# Patient Record
Sex: FEMALE | Race: WHITE | NOT HISPANIC OR LATINO | Employment: STUDENT | URBAN - METROPOLITAN AREA
[De-identification: names, ages, dates, MRNs, and addresses within clinical notes are randomized per-mention and may not be internally consistent; named-entity substitution may affect disease eponyms.]

---
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
## 2025-03-04 ENCOUNTER — PROCEDURE VISIT (OUTPATIENT)
Dept: OBGYN CLINIC | Facility: CLINIC | Age: 20
End: 2025-03-04
Payer: COMMERCIAL

## 2025-03-04 VITALS
DIASTOLIC BLOOD PRESSURE: 68 MMHG | BODY MASS INDEX: 25.07 KG/M2 | HEIGHT: 61 IN | WEIGHT: 132.8 LBS | SYSTOLIC BLOOD PRESSURE: 106 MMHG

## 2025-03-04 DIAGNOSIS — Z30.46 ENCOUNTER FOR NEXPLANON REMOVAL: Primary | ICD-10-CM

## 2025-03-04 PROCEDURE — 11982 REMOVE DRUG IMPLANT DEVICE: CPT | Performed by: NURSE PRACTITIONER

## 2025-03-04 NOTE — PATIENT INSTRUCTIONS
Remove the pressure dressing after 24 hours  Keep area clean and dry, cover with a fresh band aid daily until healed  You will have some bruising at the site, which is normal  Avoid heavy lifting with the affected arm until the incision is healed  Call the office with any concerns   Return visit if wishes to discuss options for birth control.

## 2025-03-04 NOTE — PROGRESS NOTES
[facility] - [id number] [place] [address]    Assessment/Plan:  1. Encounter for Nexplanon removal      Assessment & Plan  Encounter for Nexplanon removal  Keep area clean and dry and avoid heavy lifting until incision site is healed.   Call with any concerns              Subjective:   Amanda Mccall is a 19 y.o.  female.    HPI:   19 year old female presents for office visit for Nexplanon removal. Reports heavy bleeding since insertion, not relieved with addition of oral contraceptive. Planning not to use any birth control for awhile, using for cycle control only.         Gyn History  No LMP recorded (lmp unknown). Patient has had an implant.       Last pap smear: Not on file    She  reports being sexually active and has had partner(s) who are female.       OB History      Past Medical History:  No date: Allergies      Comment:  dust and dogs  No date: Asthma      Comment:  Inhaler  No date: Dysmenorrhea  No date: Seasonal allergies     Past Surgical History:  No date: TONSILLECTOMY     Social History     Tobacco Use    Smoking status: Never    Smokeless tobacco: Never    Tobacco comments:     Nonsmoker    Vaping Use    Vaping status: Never Used   Substance Use Topics    Alcohol use: Never     Comment: No alcohol use     Drug use: Never     Comment: No           Current Outpatient Medications:     albuterol (PROVENTIL HFA,VENTOLIN HFA) 90 mcg/act inhaler, Inhale 2 puffs every 6 (six) hours as needed, Disp: , Rfl:     amphetamine-dextroamphetamine (ADDERALL) 5 MG tablet, TAKE ONE (1) TABLET BY MOUTH EVERY MORNING AND ONE (1) TABLET AT NOON, Disp: , Rfl:     azelastine (ASTELIN) 0.1 % nasal spray, into each nostril, Disp: , Rfl:     cetirizine (ZyrTEC) 10 mg tablet, Take 10 mg by mouth daily, Disp: , Rfl:     etonogestrel (Nexplanon) subdermal implant, 68 mg by Subdermal route Once every 3 years, Disp: , Rfl:     Flovent  MCG/ACT inhaler, Inhale 2 puffs 2 (two) times a  "day, Disp: , Rfl:     dexmethylphenidate (FOCALIN XR) 20 MG 24 hr capsule, , Disp: , Rfl:     norgestimate-ethinyl estradiol (Sprintec 28) 0.25-35 MG-MCG per tablet, Take 1 tablet by mouth daily (Patient not taking: Reported on 3/4/2025), Disp: 28 tablet, Rfl: 0    She has no known allergies..    ROS: Review of Systems See HPI for details, otherwise negative    Objective:  /68 (BP Location: Left arm, Patient Position: Sitting, Cuff Size: Standard)   Ht 5' 1.25\" (1.556 m)   Wt 60.2 kg (132 lb 12.8 oz)   LMP  (LMP Unknown)   Breastfeeding No   BMI 24.89 kg/m²      Physical Exam  Constitutional:       General: She is not in acute distress.     Appearance: Normal appearance. She is not ill-appearing.   HENT:      Head: Normocephalic and atraumatic.   Pulmonary:      Effort: Pulmonary effort is normal.   Neurological:      Mental Status: She is alert.   Psychiatric:         Thought Content: Thought content normal.       Universal Protocol:  procedure performed by consultantConsent: Verbal consent obtained. Written consent not obtained.  Risks and benefits: risks, benefits and alternatives were discussed  Consent given by: patient  Time out: Immediately prior to procedure a \"time out\" was called to verify the correct patient, procedure, equipment, support staff and site/side marked as required.  Required items: required blood products, implants, devices, and special equipment available  Patient identity confirmed: verbally with patient  Remove and insert drug implant    Date/Time: 3/4/2025 12:20 PM    Performed by: NOLAN Linn  Authorized by: NOLAN Linn    Indication:     Indication: Presence of non-biodegradable drug delivery implant    Pre-procedure:     Pre-procedure timeout performed: yes      Prepped with: povidone-iodine      Local anesthetic:  Lidocaine with epinephrine    The site was cleaned and prepped in a sterile fashion: yes    Procedure:     Procedure:  Removal    Small " stab incision was made in arm: yes      Left/right:  Left    Visualization of implant was obtained: yes      Site was closed with steri-strips and pressure bandage applied: yes    Comments:      Nexplanon removed easily and intact, 2 mm incision. Device shown to pt.

## 2025-03-06 ENCOUNTER — NURSE TRIAGE (OUTPATIENT)
Dept: OTHER | Facility: OTHER | Age: 20
End: 2025-03-06

## 2025-03-06 ENCOUNTER — OFFICE VISIT (OUTPATIENT)
Dept: OBGYN CLINIC | Facility: CLINIC | Age: 20
End: 2025-03-06
Payer: COMMERCIAL

## 2025-03-06 VITALS
BODY MASS INDEX: 25.68 KG/M2 | SYSTOLIC BLOOD PRESSURE: 104 MMHG | DIASTOLIC BLOOD PRESSURE: 64 MMHG | HEIGHT: 61 IN | WEIGHT: 136 LBS

## 2025-03-06 DIAGNOSIS — R42 DIZZINESS: ICD-10-CM

## 2025-03-06 DIAGNOSIS — N92.6 IRREGULAR MENSES: Primary | ICD-10-CM

## 2025-03-06 PROCEDURE — 99213 OFFICE O/P EST LOW 20 MIN: CPT | Performed by: PHYSICIAN ASSISTANT

## 2025-03-07 NOTE — TELEPHONE ENCOUNTER
FOLLOW UP: Pt will be seen tonight in the ER STEVEN Lauren out of concern for how the opening looks to her. She states she still may want to be seen in office tomorrow. Please call to schedule if needed.     REASON FOR CONVERSATION: Follow-up (Stitch came apart from nexplon removal)    SYMPTOMS: opening of stitched area no bleeding, no pain.    OTHER: none    DISPOSITION: No disposition on file.

## 2025-03-07 NOTE — TELEPHONE ENCOUNTER
Esc response from on call Dr Linda: If she having severe pain or the incision is bleeding or red then she should be seen tonight. If not then ok to be seen tomorrow in the office.

## 2025-03-07 NOTE — TELEPHONE ENCOUNTER
"Answer Assessment - Initial Assessment Questions  1. IMPLANT TYPE: \"What type of implant are you using?\"  (e.g., Implanon, Nexplanon, Norplant, Jadelle)       Nexplanon removal  2. IMPLANT START DATE: \"When did you first start using the implant?\"      Removal 3/4/2025  3. IMPLANT LOCATION: \"Where is implant located?\"       Arm   4. SYMPTOM: \"What is the main symptom (or question) you're concerned about?\"     1-2  Stiches have come apart and skin is .   5. ONSET: \"When did the stich openstart?\"      This afternoon   6. VAGINAL BLEEDING: \"Are you having any unusual vaginal bleeding?\"      N/A  7. PAIN: \"Is there any pain?\" (Scale: 1-10; mild, moderate, severe).      Mild pain when flexing the arm and burning  8. PREGNANCY: \"Are you concerned you might be pregnant?\" \"Are you having any symptoms of pregnancy (breast tenderness, nausea, etc)?\"        N/A  9.  STD (STI): \"Are you concerned about the possibility of a STD (STI)?\" \"Are you having unprotected sex (sex without a condom)?\"      N/A    Protocols used: Contraception - Implant-Pediatric-    "

## 2025-03-07 NOTE — TELEPHONE ENCOUNTER
Esc to on call Dr Linda:Pt had her nexplanon removed this week Tuesday and two stiches have come apart ant the area is now open

## 2025-03-07 NOTE — PROGRESS NOTES
[facility] [address], [address], [place] [address]    ASSESSMENT/PLAN:     1. Irregular menses  Assessment & Plan:  Recommend continuing to monitor menses now that Nexplanon has been removed and see if they return as bad as they were previously.   Reviewed options if they do return heavy and painful including Ibuprofen 600mg every 6 hours with food, Lysteda, hormonal OCP, patch, NuvaRing, Depo Provera, levonorgestrel IUD.   Patient does not want to be on any hormonal options. Will continue to monitor and call if bad menses return.   Script for CBC, Iron studies and TFTS given to evaluate dizziness and fatigue.   Return to office for annual or as needed.   2. Dizziness  -     CBC and differential; Future  -     Iron; Future  -     Ferritin; Future  -     TSH, 3rd generation; Future  -     T4, free; Future  -     CBC and differential  -     Iron  -     Ferritin  -     TSH, 3rd generation  -     T4, free      CC:  discuss what to expect now that Nexplanon removed and options to control bleeding.     HPI: Amanda Mccall is a 19 y.o.  who presents for discussion of options to control painful, heavy menses. Patient had Nexplanon removed 3/4/2025 secondary to persistent bleeding. Previously tried OCP (Junel Fe 1/20) stopped secondary to note being good at remembering to take it daily.   Prior to Nexplanon bleeding was heavy, painful and prolonged. Also had pain in between menses.   Was taking Ibuprofen around the clock to help with pain. Went through a whole bottle of Ibuprofen in a week in the past. Had cramping in between menses.   Pelvic ultrasound done 10/28/2024 showed left simple ovarian cyst measuring 3.4 x 2.9 x 2.3 cm, decreased in size from last ultrasound. No additional follow up needed. Otherwise normal uterus.     Requests labs as still gets occasional dizziness.     Patient only sexually active with female partners, no need for contraception.     ROS: Negative except as noted  in HPI    No LMP recorded (lmp unknown). Patient has had an implant.       She  reports being sexually active and has had partner(s) who are female.       The following portions of the patient's history were reviewed and updated as appropriate:   Past Medical History:   Diagnosis Date    Allergies     dust and dogs    Asthma     Inhaler    Dysmenorrhea     Seasonal allergies      Past Surgical History:   Procedure Laterality Date    TONSILLECTOMY       Family History   Problem Relation Age of Onset    Breast cancer Mother         negative for BRCA     Lymphoma Mother      Social History     Socioeconomic History    Marital status: Single     Spouse name: None    Number of children: None    Years of education: None    Highest education level: None   Occupational History    Occupation: Student    Tobacco Use    Smoking status: Never    Smokeless tobacco: Never    Tobacco comments:     Nonsmoker    Vaping Use    Vaping status: Never Used   Substance and Sexual Activity    Alcohol use: Never     Comment: No alcohol use     Drug use: Never     Comment: No     Sexual activity: Yes     Partners: Female   Other Topics Concern    None   Social History Narrative    Mammo: Never    Colonoscopy: Never    Dexa: Never    Sexual abuse: No    Exercise 2-3 times a week    Domestic violence: No     Social Drivers of Health     Financial Resource Strain: Not on file   Food Insecurity: Not on file   Transportation Needs: Not on file   Physical Activity: Not on file   Stress: Not on file   Social Connections: Not on file   Intimate Partner Violence: Not on file   Housing Stability: Not on file     Outpatient Medications Marked as Taking for the 3/6/25 encounter (Office Visit) with Starla Stringer PA-C   Medication    albuterol (PROVENTIL HFA,VENTOLIN HFA) 90 mcg/act inhaler    amphetamine-dextroamphetamine (ADDERALL) 5 MG tablet    azelastine (ASTELIN) 0.1 % nasal spray    cetirizine (ZyrTEC) 10 mg tablet    Flovent  MCG/ACT  "inhaler    [DISCONTINUED] etonogestrel (Nexplanon) subdermal implant     No Known Allergies        Objective:  /64 (BP Location: Left arm, Patient Position: Sitting, Cuff Size: Standard)   Ht 5' 1.25\" (1.556 m)   Wt 61.7 kg (136 lb)   LMP  (LMP Unknown)   BMI 25.49 kg/m²          Physical Exam  Constitutional:       Appearance: She is well-developed.   HENT:      Head: Normocephalic and atraumatic.   Neck:      Thyroid: No thyromegaly.   Cardiovascular:      Rate and Rhythm: Normal rate and regular rhythm.      Heart sounds: Normal heart sounds. No murmur heard.     No friction rub. No gallop.   Pulmonary:      Effort: Pulmonary effort is normal. No respiratory distress.      Breath sounds: Normal breath sounds. No wheezing.   Abdominal:      General: There is no distension.      Palpations: Abdomen is soft. There is no mass.      Tenderness: There is no abdominal tenderness. There is no guarding or rebound.      Hernia: No hernia is present.   Lymphadenopathy:      Cervical: No cervical adenopathy.   Neurological:      Mental Status: She is alert and oriented to person, place, and time.   Skin:     General: Skin is warm and dry.   Psychiatric:         Behavior: Behavior normal.             Starla Stringer PA-C  3/6/2025 9:33 PM    "

## 2025-03-07 NOTE — ASSESSMENT & PLAN NOTE
Recommend continuing to monitor menses now that Nexplanon has been removed and see if they return as bad as they were previously.   Reviewed options if they do return heavy and painful including Ibuprofen 600mg every 6 hours with food, Lysteda, hormonal OCP, patch, NuvaRing, Depo Provera, levonorgestrel IUD.   Patient does not want to be on any hormonal options. Will continue to monitor and call if bad menses return.   Script for CBC, Iron studies and TFTS given to evaluate dizziness and fatigue.   Return to office for annual or as needed.

## 2025-03-15 LAB
BASOPHILS # BLD AUTO: 0 X10E3/UL (ref 0–0.2)
BASOPHILS NFR BLD AUTO: 1 %
EOSINOPHIL # BLD AUTO: 0.2 X10E3/UL (ref 0–0.4)
EOSINOPHIL NFR BLD AUTO: 5 %
ERYTHROCYTE [DISTWIDTH] IN BLOOD BY AUTOMATED COUNT: 12.5 % (ref 11.7–15.4)
FERRITIN SERPL-MCNC: 48 NG/ML (ref 15–77)
HCT VFR BLD AUTO: 39.6 % (ref 34–46.6)
HGB BLD-MCNC: 13.9 G/DL (ref 11.1–15.9)
IMM GRANULOCYTES # BLD: 0 X10E3/UL (ref 0–0.1)
IMM GRANULOCYTES NFR BLD: 0 %
IRON SERPL-MCNC: 50 UG/DL (ref 27–159)
LYMPHOCYTES # BLD AUTO: 2 X10E3/UL (ref 0.7–3.1)
LYMPHOCYTES NFR BLD AUTO: 49 %
MCH RBC QN AUTO: 30.4 PG (ref 26.6–33)
MCHC RBC AUTO-ENTMCNC: 35.1 G/DL (ref 31.5–35.7)
MCV RBC AUTO: 87 FL (ref 79–97)
MONOCYTES # BLD AUTO: 0.5 X10E3/UL (ref 0.1–0.9)
MONOCYTES NFR BLD AUTO: 12 %
MORPHOLOGY BLD-IMP: ABNORMAL
NEUTROPHILS # BLD AUTO: 1.3 X10E3/UL (ref 1.4–7)
NEUTROPHILS NFR BLD AUTO: 33 %
PLATELET # BLD AUTO: 135 X10E3/UL (ref 150–450)
RBC # BLD AUTO: 4.57 X10E6/UL (ref 3.77–5.28)
T4 FREE SERPL-MCNC: 1.19 NG/DL (ref 0.93–1.6)
TSH SERPL DL<=0.005 MIU/L-ACNC: 0.97 UIU/ML (ref 0.45–4.5)
WBC # BLD AUTO: 4.1 X10E3/UL (ref 3.4–10.8)

## 2025-03-17 ENCOUNTER — RESULTS FOLLOW-UP (OUTPATIENT)
Dept: OBGYN CLINIC | Facility: CLINIC | Age: 20
End: 2025-03-17

## 2025-03-29 ENCOUNTER — NURSE TRIAGE (OUTPATIENT)
Dept: OTHER | Facility: OTHER | Age: 20
End: 2025-03-29

## 2025-03-29 NOTE — TELEPHONE ENCOUNTER
"FOLLOW UP: OBGYN    REASON FOR CONVERSATION: Unprotected Sex and Pelvic Pain    SYMPTOMS: Condom Leaking, and chronic intermittent pelvic pain    OTHER: n/a    DISPOSITION: See PCP Within 2 Weeks, Call PCP Within 24 Hours  Reason for Disposition   [1] Unprotected sexual intercourse AND [2] within past 72 to 120 hours (3 to 5 days)   Abdominal pain is a chronic symptom (recurrent or ongoing AND present > 4 weeks)    Answer Assessment - Initial Assessment Questions  1. REASON FOR CALL or QUESTION: \"What is your reason for calling today?\" or \"How can I best help you?\" or \"What question do you have that I can help answer?\"     Leaking Condom noted after intercourse tonight. Would like to know if risk for pregnancy, and emergency contraceptive. Also reports intermittent abdominal/pelvic  pain rated 6/10 while not active. No Pain as of this time. (Chronic ongoing problem discussed with OBGYN) Also would like discuss contraceptive full time.    Protocols used: Contraception - Emergency-Adult-, Information Only Call-ADULT-AH, Abdominal Pain - Female-Adult-AH    "

## 2025-03-29 NOTE — TELEPHONE ENCOUNTER
Patient calling due to concern with risk of pregnancy after noting semen leaking around condom. Education provided on risk of pregnancy with any internal; exposure to semen, and emergency contraceptive options. Patient  requesting appointment to discuss birth control option with provider, and continued intermittent chronic pelvic pain, now resolved. No additional symptoms reported.  Care advice given including education for recognition of severe signs/symptoms which would require immediate evaluation. Informed to call back if worsening/developing symptoms and/or uncertain. Verbalized understanding. Agreeable with disposition. No further questions.

## 2025-04-03 ENCOUNTER — OFFICE VISIT (OUTPATIENT)
Dept: OBGYN CLINIC | Facility: CLINIC | Age: 20
End: 2025-04-03
Payer: COMMERCIAL

## 2025-04-03 VITALS
HEIGHT: 61 IN | DIASTOLIC BLOOD PRESSURE: 58 MMHG | WEIGHT: 130 LBS | SYSTOLIC BLOOD PRESSURE: 102 MMHG | BODY MASS INDEX: 24.55 KG/M2

## 2025-04-03 DIAGNOSIS — N92.0 MENORRHAGIA WITH REGULAR CYCLE: Primary | ICD-10-CM

## 2025-04-03 DIAGNOSIS — Z11.3 ROUTINE SCREENING FOR STI (SEXUALLY TRANSMITTED INFECTION): ICD-10-CM

## 2025-04-03 DIAGNOSIS — N94.6 DYSMENORRHEA: ICD-10-CM

## 2025-04-03 DIAGNOSIS — R10.2 PELVIC PAIN: ICD-10-CM

## 2025-04-03 DIAGNOSIS — Z32.02 NEGATIVE PREGNANCY TEST: ICD-10-CM

## 2025-04-03 DIAGNOSIS — Z80.3 FAMILY HISTORY OF BREAST CANCER: ICD-10-CM

## 2025-04-03 LAB — SL AMB POCT URINE HCG: NORMAL

## 2025-04-03 PROCEDURE — 99214 OFFICE O/P EST MOD 30 MIN: CPT | Performed by: PHYSICIAN ASSISTANT

## 2025-04-03 PROCEDURE — 81025 URINE PREGNANCY TEST: CPT | Performed by: PHYSICIAN ASSISTANT

## 2025-04-03 RX ORDER — ETONOGESTREL AND ETHINYL ESTRADIOL VAGINAL RING .015; .12 MG/D; MG/D
RING VAGINAL
Qty: 3 EACH | Refills: 1 | Status: SHIPPED | OUTPATIENT
Start: 2025-04-03

## 2025-04-03 NOTE — PROGRESS NOTES
[facility] [address], [address], [place] [address]    ASSESSMENT/PLAN:     1. Menorrhagia with regular cycle  Assessment & Plan:  Reviewed heavy, painful menses as well as options. Will plan pelvic ultrasound to further evaluate.   Reviewed OCP (was not good at remembering in the past), NuvaRing, patch, IUD.   Most interested in NuvaRing. Reviewed how to insert, can take out for up to 3 hrs during intercourse if bothering her or her partner. Reviewed when to insert. Recommend using condoms for the 1st month and for STD prevention.  Reviewed common side effects including nausea, vomiting, breast pain, bloating, fatigue, mood swings, weight gain, increased acne.  Reassured side effects typically diminished in the 1st month or 2. Reviewed clotting risk and signs and symptoms of pulmonary embolism, DVT, myocardial infarction, stroke.  Return to office in 3 months for NuvaRing check.     Orders:  -     etonogestrel-ethinyl estradiol (NuvaRing) 0.12-0.015 MG/24HR vaginal ring; Insert vaginally and leave in place for 3 consecutive weeks, then remove for 1 week.  2. Negative pregnancy test  Assessment & Plan:  Reviewed pregnancy test negative in the office. Too soon from episode of intercourse to rule on pregnancy. Reviewed to take a test 2 weeks after the episode of intercourse occurred or if menses is late for more accurate result.   Orders:  -     POCT urine HCG  3. Family history of breast cancer  Assessment & Plan:  Reviewed family hx of breast cancer. Reassuring mother's genetic testing was negative. Patient would like genetic testing as well. Information given.   Orders:  -     Ambulatory Referral to Oncology Genetics; Future  4. Pelvic pain  -     US pelvis complete w transvaginal; Future; Expected date: 04/03/2025  5. Dysmenorrhea  -     US pelvis complete w transvaginal; Future; Expected date: 04/03/2025  6. Routine screening for STI (sexually transmitted infection)  Assessment &  Plan:  STD cultures done today.   Orders:  -     Chlamydia/GC MONICA, Confirmation      CC:  birth control discussion    HPI: Amanda Mccall is a 19 y.o.  who presents for birth control discussion. Patient had Nexplanon removed on 3/4/2025 secondary to irregular bleeding. Since getting off the Nexplanon has had menses and is very painful and heavy. Also now sexually active with male partner and would like a birth control method. Would also like STD testing. Concerned about possible pregnancy. [place] was just a few days ago.     No known family hx of blood clotting disorders, no migraines with aura. Nonsmoker.     Family hx of breast cancer in mother dx age 40. Mother was negative for BRCA mutation. Maternal aunt with breast cancer as well.     ROS: Negative except as noted in HPI    Patient's last menstrual period was 2025.       She  reports being sexually active and has had partner(s) who are female.       The following portions of the patient's history were reviewed and updated as appropriate:   Past Medical History:   Diagnosis Date    Allergies     dust and dogs    Asthma     Inhaler    Dysmenorrhea     Seasonal allergies      Past Surgical History:   Procedure Laterality Date    TONSILLECTOMY       Family History   Problem Relation Age of Onset    Breast cancer Mother 40        negative for BRCA     Lymphoma Mother     Breast cancer Maternal Aunt      Social History     Socioeconomic History    Marital status: Single     Spouse name: Not on file    Number of children: Not on file    Years of education: Not on file    Highest education level: Not on file   Occupational History    Occupation: Student    Tobacco Use    Smoking status: Never    Smokeless tobacco: Never    Tobacco comments:     Nonsmoker    Vaping Use    Vaping status: Never Used   Substance and Sexual Activity    Alcohol use: Never     Comment: No alcohol use     Drug use: Never     Comment: No     Sexual activity: Yes     Partners:  "Female   Other Topics Concern    Not on file   Social History Narrative    Mammo: Never    Colonoscopy: Never    Dexa: Never    Sexual abuse: No    Exercise 2-3 times a week    Domestic violence: No     Social Drivers of Health     Financial Resource Strain: Not on file   Food Insecurity: Not on file   Transportation Needs: Not on file   Physical Activity: Not on file   Stress: Not on file   Social Connections: Not on file   Intimate Partner Violence: Not on file   Housing Stability: Not on file     Outpatient Medications Marked as Taking for the 4/3/25 encounter (Office Visit) with Starla Stringer PA-C   Medication    albuterol (PROVENTIL HFA,VENTOLIN HFA) 90 mcg/act inhaler    amphetamine-dextroamphetamine (ADDERALL) 5 MG tablet    azelastine (ASTELIN) 0.1 % nasal spray    cetirizine (ZyrTEC) 10 mg tablet    etonogestrel-ethinyl estradiol (NuvaRing) 0.12-0.015 MG/24HR vaginal ring    Flovent  MCG/ACT inhaler     No Known Allergies        Objective:  /58 (BP Location: Left arm, Patient Position: Sitting, Cuff Size: Standard)   Ht 5' 1.25\" (1.556 m)   Wt 59 kg (130 lb)   LMP 03/29/2025   BMI 24.36 kg/m²        Chaperone present? Yes: patient's  was present for entire visit.     Physical Exam  Constitutional:       Appearance: Normal appearance. She is well-developed.   Genitourinary:      Vulva and bladder normal.      No lesions in the vagina.      Right Labia: No rash, tenderness, lesions or skin changes.     Left Labia: No tenderness, lesions, skin changes or rash.     No labial fusion noted.      No inguinal adenopathy present in the right or left side.     No vaginal discharge, erythema, tenderness or bleeding.        Right Adnexa: not tender, not full and no mass present.     Left Adnexa: not tender, not full and no mass present.     No cervical motion tenderness, discharge or lesion.      Uterus is not enlarged, tender or irregular.      No uterine mass detected.     No " urethral prolapse, tenderness or mass present.      Bladder is not tender.    HENT:      Head: Normocephalic and atraumatic.   Neck:      Thyroid: No thyromegaly.   Cardiovascular:      Rate and Rhythm: Normal rate and regular rhythm.      Heart sounds: Normal heart sounds. No murmur heard.     No friction rub. No gallop.   Pulmonary:      Effort: Pulmonary effort is normal. No respiratory distress.      Breath sounds: Normal breath sounds. No wheezing.   Abdominal:      General: There is no distension.      Palpations: Abdomen is soft. There is no mass.      Tenderness: There is no abdominal tenderness. There is no guarding or rebound.      Hernia: No hernia is present.   Lymphadenopathy:      Cervical: No cervical adenopathy.      Upper Body:      Right upper body: No pectoral adenopathy.      Left upper body: No pectoral adenopathy.      Lower Body: No right inguinal adenopathy. No left inguinal adenopathy.   Neurological:      Mental Status: She is alert and oriented to person, place, and time.   Skin:     General: Skin is warm and dry.   Psychiatric:         Behavior: Behavior normal.     I have spent a total time of 45 minutes in caring for this patient on the day of the visit/encounter including Risks and benefits of tx options, Instructions for management, Importance of tx compliance, Counseling / Coordination of care, Documenting in the medical record, and Reviewing/placing orders in the medical record (including tests, medications, and/or procedures).          Starla Stringer PA-C  4/9/2025 6:35 PM

## 2025-04-08 ENCOUNTER — RESULTS FOLLOW-UP (OUTPATIENT)
Dept: OBGYN CLINIC | Facility: CLINIC | Age: 20
End: 2025-04-08

## 2025-04-08 ENCOUNTER — TELEPHONE (OUTPATIENT)
Age: 20
End: 2025-04-08

## 2025-04-08 LAB
C TRACH RRNA SPEC QL NAA+PROBE: NEGATIVE
N GONORRHOEA RRNA SPEC QL NAA+PROBE: NEGATIVE

## 2025-04-08 NOTE — TELEPHONE ENCOUNTER
Patient states nuva ring is not covered by her insurance . Patient was notified by pharmacy when she went to pick it up at the pharmacy

## 2025-04-08 NOTE — TELEPHONE ENCOUNTER
BILATERAL DIGITAL SCREENING MAMMOGRAM with CAD: 02/27/19



CLINICAL: Routine screening.



COMPARISON:None available.  However, a prior mammogram was apparently 

done at [facility].



FINDINGS: The breasts are heterogeneously dense, which may obscure 

small masses.  A right asymmetry on the CC view requires comparison 

with a prior mammogram or additional imaging.No architectural 

distortion or suspicious calcifications.The left breast is negative.



IMPRESSION: Right asymmetry requiring further evaluation.



BI-RADS CATEGORY:  0 -- Additional Evaluation Required



RECOMMENDATION: Comparison with a previous mammogram.



We will attempt to obtain a prior mammogram for comparison.  If we do 

not obtain a prior mammogram  within 30 days, a revised report will be 

issued recommending a recall for additional imaging. Please be advised 

that the patient should not schedule an appointment for return until 

adequate time (at least 2 weeks) has passed for us to obtain the prior 

mammogram.



ACR BI-RADS MAMMOGRAPHIC CODES:

0 = Needs additional imaging evaluation; 1 = Negative; 2 = Benign; 3 = 

Probably benign; 4 = Suspicious; 5 = Malignant; 6 = Known biopsy-proven 

malignancy



COMMENT:

      1.   Dense breast tissue, i.e., adenosis, fibrocystic 

            changes, etc., may obscure an underlying neoplasm.

      2.   Approximately 10% of cancers are not detected with

            mammography.

      3.   A negative mammography report should not delay biopsy 

            if a clinically suspicious mass is present.



COMMENT:

Patient follow-up letters are generated via our [employer] application. [place] for NuvaRing CANCELLED due to     []Approval on file-dates approved   []Medication already on Formulary  [x]Brand Name Preferred  []Patient no longer covered by insurance  []Therapy Changed/Medication Discontinued    Patient advised by     []My Chart Message  []Phone call  [x]LMOM    Scanned into Media  no

## 2025-04-09 PROBLEM — Z32.02 NEGATIVE PREGNANCY TEST: Status: ACTIVE | Noted: 2025-04-09

## 2025-04-09 PROBLEM — N92.0 MENORRHAGIA WITH REGULAR CYCLE: Status: ACTIVE | Noted: 2025-04-09

## 2025-04-09 PROBLEM — Z80.3 FAMILY HISTORY OF BREAST CANCER: Status: ACTIVE | Noted: 2025-04-09

## 2025-04-09 PROBLEM — Z11.3 ROUTINE SCREENING FOR STI (SEXUALLY TRANSMITTED INFECTION): Status: ACTIVE | Noted: 2025-04-09

## 2025-04-09 NOTE — ASSESSMENT & PLAN NOTE
Reviewed pregnancy test negative in the office. Too soon from episode of intercourse to rule on pregnancy. Reviewed to take a test 2 weeks after the episode of intercourse occurred or if menses is late for more accurate result.

## 2025-04-09 NOTE — ASSESSMENT & PLAN NOTE
Reviewed family hx of breast cancer. Reassuring mother's genetic testing was negative. Patient would like genetic testing as well. Information given.

## 2025-04-09 NOTE — ASSESSMENT & PLAN NOTE
Reviewed heavy, painful menses as well as options. Will plan pelvic ultrasound to further evaluate.   Reviewed OCP (was not good at remembering in the past), NuvaRing, patch, IUD.   Most interested in NuvaRing. Reviewed how to insert, can take out for up to 3 hrs during intercourse if bothering her or her partner. Reviewed when to insert. Recommend using condoms for the 1st month and for STD prevention.  Reviewed common side effects including nausea, vomiting, breast pain, bloating, fatigue, mood swings, weight gain, increased acne.  Reassured side effects typically diminished in the 1st month or 2. Reviewed clotting risk and signs and symptoms of pulmonary embolism, DVT, myocardial infarction, stroke.  Return to office in 3 months for NuvaRing check.

## 2025-05-09 PROBLEM — Z11.3 ROUTINE SCREENING FOR STI (SEXUALLY TRANSMITTED INFECTION): Status: RESOLVED | Noted: 2025-04-09 | Resolved: 2025-05-09

## 2025-06-26 ENCOUNTER — TELEPHONE (OUTPATIENT)
Age: 20
End: 2025-06-26

## 2025-06-26 ENCOUNTER — OFFICE VISIT (OUTPATIENT)
Dept: OBGYN CLINIC | Facility: CLINIC | Age: 20
End: 2025-06-26
Payer: COMMERCIAL

## 2025-06-26 VITALS
SYSTOLIC BLOOD PRESSURE: 126 MMHG | BODY MASS INDEX: 24.55 KG/M2 | DIASTOLIC BLOOD PRESSURE: 60 MMHG | HEIGHT: 61 IN | WEIGHT: 130 LBS

## 2025-06-26 DIAGNOSIS — Z78.9 USES VAGINAL CONTRACEPTIVE RING: ICD-10-CM

## 2025-06-26 DIAGNOSIS — Z78.9 USES VAGINAL CONTRACEPTIVE RING: Primary | ICD-10-CM

## 2025-06-26 DIAGNOSIS — Z30.44 ENCOUNTER FOR SURVEILLANCE OF VAGINAL RING HORMONAL CONTRACEPTIVE DEVICE: Primary | ICD-10-CM

## 2025-06-26 PROCEDURE — 99213 OFFICE O/P EST LOW 20 MIN: CPT | Performed by: PHYSICIAN ASSISTANT

## 2025-06-26 RX ORDER — ETONOGESTREL AND ETHINYL ESTRADIOL VAGINAL RING .015; .12 MG/D; MG/D
RING VAGINAL
Qty: 3 EACH | Refills: 1 | Status: SHIPPED | OUTPATIENT
Start: 2025-06-26 | End: 2025-06-26 | Stop reason: ALTCHOICE

## 2025-06-26 RX ORDER — ETONOGESTREL AND ETHINYL ESTRADIOL VAGINAL RING .015; .12 MG/D; MG/D
RING VAGINAL
Refills: 1 | OUTPATIENT
Start: 2025-06-26

## 2025-06-26 RX ORDER — MONTELUKAST SODIUM 5 MG/1
TABLET, CHEWABLE ORAL
COMMUNITY
Start: 2025-06-12

## 2025-06-26 RX ORDER — DEXTROAMPHETAMINE SACCHARATE, AMPHETAMINE ASPARTATE, DEXTROAMPHETAMINE SULFATE AND AMPHETAMINE SULFATE 2.5; 2.5; 2.5; 2.5 MG/1; MG/1; MG/1; MG/1
TABLET ORAL
COMMUNITY
Start: 2025-04-06

## 2025-06-26 RX ORDER — FLUTICASONE PROPIONATE 50 MCG
1 SPRAY, SUSPENSION (ML) NASAL DAILY
COMMUNITY
Start: 2025-05-15

## 2025-06-26 RX ORDER — MOMETASONE FUROATE 200 UG/1
2 AEROSOL RESPIRATORY (INHALATION) 2 TIMES DAILY
COMMUNITY
Start: 2025-05-16

## 2025-06-26 RX ORDER — ETONOGESTREL/ETHINYL ESTRADIOL .12-.015MG
RING, VAGINAL VAGINAL
Qty: 1 EACH | Refills: 4 | Status: SHIPPED | OUTPATIENT
Start: 2025-06-26

## 2025-06-26 NOTE — TELEPHONE ENCOUNTER
Brand Name NUVARING  is the preferred alternative, Generic ELUURING  is not covered. Please send rx to pharmacy as Brand Name NUVARING  please make sure the [employer] box is checked off so rx when sending rx so it's sent correctly to pharmacy as Brand Name Only.

## 2025-06-27 NOTE — ASSESSMENT & PLAN NOTE
Reviewed birth control options.   Patient would like to trial NuvaRing. Will send through to pharmacy. Confirmed with pharmacy after visit that script was approved and filled.   Reviewed how to insert, can take out for up to 3 hrs during intercourse if bothering her or her partner. Reviewed  to start this Sunday (at the end of pill pack currently). Recommend using condoms for the 1st month and for STD prevention.  Reviewed common side effects including nausea, vomiting, breast pain, bloating, fatigue, mood swings, weight gain, increased acne.  Reassured side effects typically diminished in the 1st month or 2. Reviewed clotting risk and signs and symptoms of pulmonary embolism, DVT, myocardial infarction, stroke.  Return to office for check in 3 months.